# Patient Record
Sex: MALE | Race: BLACK OR AFRICAN AMERICAN | NOT HISPANIC OR LATINO | Employment: UNEMPLOYED | URBAN - METROPOLITAN AREA
[De-identification: names, ages, dates, MRNs, and addresses within clinical notes are randomized per-mention and may not be internally consistent; named-entity substitution may affect disease eponyms.]

---

## 2019-05-05 ENCOUNTER — APPOINTMENT (OUTPATIENT)
Dept: GENERAL RADIOLOGY | Facility: HOSPITAL | Age: 29
End: 2019-05-05

## 2019-05-05 ENCOUNTER — HOSPITAL ENCOUNTER (EMERGENCY)
Facility: HOSPITAL | Age: 29
Discharge: HOME | End: 2019-05-05
Attending: EMERGENCY MEDICINE | Admitting: EMERGENCY MEDICINE

## 2019-05-05 VITALS
TEMPERATURE: 97.9 F | HEIGHT: 71 IN | SYSTOLIC BLOOD PRESSURE: 128 MMHG | DIASTOLIC BLOOD PRESSURE: 50 MMHG | WEIGHT: 202 LBS | OXYGEN SATURATION: 98 % | RESPIRATION RATE: 18 BRPM | BODY MASS INDEX: 28.28 KG/M2 | HEART RATE: 96 BPM

## 2019-05-05 DIAGNOSIS — S30.0XXA LUMBAR CONTUSION, INITIAL ENCOUNTER: Primary | ICD-10-CM

## 2019-05-05 PROCEDURE — 72110 X-RAY EXAM L-2 SPINE 4/>VWS: CPT

## 2019-05-05 PROCEDURE — 99283 EMERGENCY DEPT VISIT LOW MDM: CPT
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
## 2024-07-30 ENCOUNTER — LAB (OUTPATIENT)
Dept: FAMILY MEDICINE CLINIC | Facility: CLINIC | Age: 34
End: 2024-07-30
Payer: COMMERCIAL

## 2024-07-30 ENCOUNTER — OFFICE VISIT (OUTPATIENT)
Dept: FAMILY MEDICINE CLINIC | Facility: CLINIC | Age: 34
End: 2024-07-30
Payer: COMMERCIAL

## 2024-07-30 VITALS
WEIGHT: 280 LBS | RESPIRATION RATE: 18 BRPM | OXYGEN SATURATION: 97 % | BODY MASS INDEX: 39.2 KG/M2 | HEIGHT: 71 IN | HEART RATE: 63 BPM | DIASTOLIC BLOOD PRESSURE: 84 MMHG | SYSTOLIC BLOOD PRESSURE: 134 MMHG

## 2024-07-30 DIAGNOSIS — E66.09 CLASS 2 OBESITY DUE TO EXCESS CALORIES WITHOUT SERIOUS COMORBIDITY IN ADULT, UNSPECIFIED BMI: ICD-10-CM

## 2024-07-30 DIAGNOSIS — Z13.1 DIABETES MELLITUS SCREENING: ICD-10-CM

## 2024-07-30 DIAGNOSIS — R03.0 ELEVATED BLOOD PRESSURE READING: ICD-10-CM

## 2024-07-30 DIAGNOSIS — Z13.220 LIPID SCREENING: ICD-10-CM

## 2024-07-30 DIAGNOSIS — Z00.00 PREVENTATIVE HEALTH CARE: Primary | ICD-10-CM

## 2024-07-30 DIAGNOSIS — R29.818 SUSPECTED SLEEP APNEA: ICD-10-CM

## 2024-07-30 DIAGNOSIS — S86.811A STRAIN OF CALF MUSCLE, RIGHT, INITIAL ENCOUNTER: ICD-10-CM

## 2024-07-30 PROBLEM — E66.812 CLASS 2 OBESITY DUE TO EXCESS CALORIES WITHOUT SERIOUS COMORBIDITY IN ADULT: Status: ACTIVE | Noted: 2024-07-30

## 2024-07-30 LAB — TSH SERPL DL<=0.05 MIU/L-ACNC: 1.21 UIU/ML (ref 0.27–4.2)

## 2024-07-30 PROCEDURE — 83036 HEMOGLOBIN GLYCOSYLATED A1C: CPT | Performed by: INTERNAL MEDICINE

## 2024-07-30 PROCEDURE — 36415 COLL VENOUS BLD VENIPUNCTURE: CPT

## 2024-07-30 PROCEDURE — 84443 ASSAY THYROID STIM HORMONE: CPT | Performed by: INTERNAL MEDICINE

## 2024-07-30 PROCEDURE — 85025 COMPLETE CBC W/AUTO DIFF WBC: CPT | Performed by: INTERNAL MEDICINE

## 2024-07-30 PROCEDURE — 80048 BASIC METABOLIC PNL TOTAL CA: CPT | Performed by: INTERNAL MEDICINE

## 2024-07-30 PROCEDURE — 80061 LIPID PANEL: CPT | Performed by: INTERNAL MEDICINE

## 2024-07-30 NOTE — PROGRESS NOTES
Chief Complaint  [facility]    HPI:    Elieser Vargas presents to [facility]    Patient is a 33-year-old male presenting to establish care.    Patient reports that he has been having right lower leg pain that is predominantly found in the calf. He initial symptoms started about two weeks ago and has been increasing over the past week. Discomfort present all the time but is not always painful. Area, non-radiating, dull ache right lateral calf. Nothing really makes symptoms better or worse other than standing and certain activities.   Patient has been trying OTC medications, activity modifications, ice. Denies massage, stretching, PT. Denies fever, chills, nausea, vomiting. Denies numbness, tingling, weakness, saddle anesthesia, urinary/fecal incontinence, focal sensory/motor deficit. Denies recent trauma, bending/twisting, injury. 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 No prior or recent imaging. Denies prior physical therapy, back corticosteroid injections, surgeries, or procedure.   Denies chest pain, shortness of breath, orthopnea, PND, and lower extremity edema  Denies palpitations, tachycardia, dizziness, lightheadedness, and syncope. Denies personal or family history of DVT or PE or DVT/PE risk factors including recent surgery, periods of immobility     No additional medical conditions. Overall healthy.     Home blood pressures usually about 110's/60-70's. Denies chest pain, shortness of breath, orthopnea, PND, lower extremity edema, palpitations, tachycardia.       Preventative:    Social:     Diet and Exercise: Could be better    Alcohol, Tobacco, and Recreational Drug use: Occasional alcohol and tobacco use    Cancer screenings:  PSA: No known family history of prostate cancer  Colonoscopy: No known family history of colon cancer    Immunizations: Potentially due for tetanus and COVID.    [facility] Directive: Not on  "file      Review of Systems:  ROS negative unless otherwise noted in HPI above.    Past Medical History:   Diagnosis Date    Obesity        No current outpatient medications on file.    Social History     Socioeconomic History    Marital status:    Tobacco Use    Smoking status: Every Day     Current packs/day: 1.00     Average packs/day: 1 pack/day for 15.0 years (15.0 ttl pk-yrs)     Types: Cigars, Cigarettes    Smokeless tobacco: Never   Vaping Use    Vaping status: Never Used   Substance and Sexual Activity    Alcohol use: Yes    Drug use: Never    Sexual activity: Yes     Partners: Female        Objective   Vital Signs:  /84   Pulse 63   Resp 18   Ht 180.3 cm (71\")   Wt 127 kg (280 lb)   SpO2 97%   BMI 39.05 kg/m²   Estimated body mass index is 39.05 kg/m² as calculated from the following:    Height as of this encounter: 180.3 cm (71\").    Weight as of this encounter: 127 kg (280 lb).    Physical Exam:  General: Well-appearing patient, no apparent distress  HEENT: No posterior pharynx erythema, no tonsillar erythema or exudates, normal external auditory canals, TM normal without bulging or erythema  Neck: No cervical lymphadenopathy  Cardiac: Regular rate and rhythm, normal S1/S2, no murmur, rubs or gallops, no lower extremity edema  Lungs: Clear to auscultation bilaterally, no crackles or wheezes  Abdomen: Soft, non-tender, no guarding or rebound tenderness, no hepatosplenomegaly  Skin: No significant rashes or lesions  MSK: Grossly normal tone and strength, point tenderness to deep palpation of the lateral gastrocnemius  Neuro: Alert and oriented x3, CN II-XII grossly intact  Psych: Appropriate mood and affect    Assessment and Plan:    (Z00.00) Preventative health care  Patient is a 33 year old male who is overall doing well. Reviewed social and family history. Encouraged increased healthy diet and exercise and discussed importance to overall health.  Up-to-date with age and gender " appropriate cancer screenings.. Discussed indicated vaccines based on age and comorbidities. No skin, mood concerns.  Plan:  - Encourage healthy diet and exercise  - Up date vaccines, if necessary  - Screening labs as ordered  -Encouraged tobacco cessation    (S86.811A) Strain of calf muscle, right, initial encounter  Assessment: Patient with recent calf strain most likely caused by increased activity at work.  No recent DVT risk factors or increased swelling suggestive of DVT.  Discussed typical clinical course, treatment, as well as signs and symptoms which should prompt reevaluation.  Plan:   - Over-the-counter medications including acetaminophen, ibuprofen as needed  - Activity modification  - Heat/ice as needed  - Voltaren as needed  - Icy hot as needed  - Hold on imaging for now  - Consider referral if not improving    (R03.0) Elevated blood pressure reading -   Assessment: Blood pressure generally well-controlled on home readings.  Blood pressure likely slightly elevated in clinic due to whitecoat effect.  Continuing to monitor home blood pressures before considering adding antihypertensive medication.  Discussed importance of diet and activity.  Plan:  - Basic metabolic panel, CBC & Differential, TSH Rfx On Abnormal To Free T4  - Continue current medications as prescribed  - Intermittently monitor home blood pressures and follow up if elevated  - Encourage healthy diet and exercise    (R29.818) Suspected sleep apnea  Assessment: Patient does have periods of reported snoring and apnea.  Currently scheduled with sleeps medicine tomorrow for consideration of sleep study and sleep apnea.  Plan:  - Follow-up with sleep medicine as scheduled    (E66.09) Class 2 obesity due to excess calories without serious comorbidity in adult, unspecified BMI  Class 2 Severe Obesity (BMI >=35 and <=39.9). Obesity-related health conditions include the following: none. Obesity is newly identified. BMI is is above average; BMI  management plan is completed. We discussed portion control and increasing exercise.    (Z13.220) Lipid screening - Plan: Lipid panel    (Z13.1) Diabetes mellitus screening - Plan: Hemoglobin A1c      Patient was given instructions and counseling regarding his condition or for health maintenance advice. Please see specific information pulled into the AVS if appropriate.       Dr Mario Mcfarlane   Internal Medicine Physician  [facility]--[address], [address]  [place] [phone]

## 2024-07-30 NOTE — PATIENT INSTRUCTIONS
Please stop at lab on second floor to have blood drawn    Due for Tetanus and COVID; can get at any local pharmacy    Calf pain  - Over-the-counter medications including acetaminophen, ibuprofen as needed  - Activity modification  - Heat/ice as needed  - Voltaren as needed  - Icy hot as needed  - Hold on imaging for now  - Consider referral if not improving    Encourage healthy diet and exercise    Monitor and record home blood pressures; follow up if consistently >135/85    Encourage smoking cessation    Follow up annual care or sooner if something arises

## 2024-07-31 ENCOUNTER — PATIENT MESSAGE (OUTPATIENT)
Dept: FAMILY MEDICINE CLINIC | Facility: CLINIC | Age: 34
End: 2024-07-31
Payer: COMMERCIAL

## 2024-07-31 DIAGNOSIS — M79.661 RIGHT CALF PAIN: Primary | ICD-10-CM

## 2024-07-31 LAB
ANION GAP SERPL CALCULATED.3IONS-SCNC: 10.5 MMOL/L (ref 5–15)
BASOPHILS # BLD AUTO: 0.12 10*3/MM3 (ref 0–0.2)
BASOPHILS NFR BLD AUTO: 0.9 % (ref 0–1.5)
BUN SERPL-MCNC: 13 MG/DL (ref 6–20)
BUN/CREAT SERPL: 11.1 (ref 7–25)
CALCIUM SPEC-SCNC: 9.5 MG/DL (ref 8.6–10.5)
CHLORIDE SERPL-SCNC: 106 MMOL/L (ref 98–107)
CHOLEST SERPL-MCNC: 172 MG/DL (ref 0–200)
CO2 SERPL-SCNC: 20.5 MMOL/L (ref 22–29)
CREAT SERPL-MCNC: 1.17 MG/DL (ref 0.76–1.27)
DEPRECATED RDW RBC AUTO: 41.9 FL (ref 37–54)
EGFRCR SERPLBLD CKD-EPI 2021: 83.9 ML/MIN/1.73
EOSINOPHIL # BLD AUTO: 0.69 10*3/MM3 (ref 0–0.4)
EOSINOPHIL NFR BLD AUTO: 5 % (ref 0.3–6.2)
ERYTHROCYTE [DISTWIDTH] IN BLOOD BY AUTOMATED COUNT: 12.8 % (ref 12.3–15.4)
GLUCOSE SERPL-MCNC: 84 MG/DL (ref 65–99)
HBA1C MFR BLD: 5.6 % (ref 4.8–5.6)
HCT VFR BLD AUTO: 46.4 % (ref 37.5–51)
HDLC SERPL-MCNC: 38 MG/DL (ref 40–60)
HGB BLD-MCNC: 15.4 G/DL (ref 13–17.7)
IMM GRANULOCYTES # BLD AUTO: 0.06 10*3/MM3 (ref 0–0.05)
IMM GRANULOCYTES NFR BLD AUTO: 0.4 % (ref 0–0.5)
LDLC SERPL CALC-MCNC: 105 MG/DL (ref 0–100)
LDLC/HDLC SERPL: 2.67 {RATIO}
LYMPHOCYTES # BLD AUTO: 5.96 10*3/MM3 (ref 0.7–3.1)
LYMPHOCYTES NFR BLD AUTO: 43.5 % (ref 19.6–45.3)
MCH RBC QN AUTO: 29.8 PG (ref 26.6–33)
MCHC RBC AUTO-ENTMCNC: 33.2 G/DL (ref 31.5–35.7)
MCV RBC AUTO: 89.9 FL (ref 79–97)
MONOCYTES # BLD AUTO: 0.88 10*3/MM3 (ref 0.1–0.9)
MONOCYTES NFR BLD AUTO: 6.4 % (ref 5–12)
NEUTROPHILS NFR BLD AUTO: 43.8 % (ref 42.7–76)
NEUTROPHILS NFR BLD AUTO: 6 10*3/MM3 (ref 1.7–7)
NRBC BLD AUTO-RTO: 0 /100 WBC (ref 0–0.2)
PLATELET # BLD AUTO: 333 10*3/MM3 (ref 140–450)
PMV BLD AUTO: 9.5 FL (ref 6–12)
POTASSIUM SERPL-SCNC: 4 MMOL/L (ref 3.5–5.2)
RBC # BLD AUTO: 5.16 10*6/MM3 (ref 4.14–5.8)
SODIUM SERPL-SCNC: 137 MMOL/L (ref 136–145)
TRIGL SERPL-MCNC: 162 MG/DL (ref 0–150)
VLDLC SERPL-MCNC: 29 MG/DL (ref 5–40)
WBC NRBC COR # BLD AUTO: 13.71 10*3/MM3 (ref 3.4–10.8)

## 2024-08-05 ENCOUNTER — TELEPHONE (OUTPATIENT)
Dept: FAMILY MEDICINE CLINIC | Facility: CLINIC | Age: 34
End: 2024-08-05
Payer: COMMERCIAL

## 2024-08-05 DIAGNOSIS — M79.661 RIGHT CALF PAIN: Primary | ICD-10-CM

## 2024-08-05 NOTE — TELEPHONE ENCOUNTER
Dr. Mcfarlane, This is what they sent me regarding the order for this patient.   8/2 - please put in new order for [facility].         Thanks, Gianna

## 2024-08-07 ENCOUNTER — TELEPHONE (OUTPATIENT)
Dept: FAMILY MEDICINE CLINIC | Facility: CLINIC | Age: 34
End: 2024-08-07

## 2024-08-07 ENCOUNTER — OFFICE VISIT (OUTPATIENT)
Dept: FAMILY MEDICINE CLINIC | Facility: CLINIC | Age: 34
End: 2024-08-07
Payer: COMMERCIAL

## 2024-08-07 VITALS
HEART RATE: 72 BPM | DIASTOLIC BLOOD PRESSURE: 90 MMHG | HEIGHT: 71 IN | RESPIRATION RATE: 16 BRPM | BODY MASS INDEX: 39.73 KG/M2 | SYSTOLIC BLOOD PRESSURE: 142 MMHG | WEIGHT: 283.8 LBS | OXYGEN SATURATION: 97 %

## 2024-08-07 DIAGNOSIS — R29.818 SUSPECTED SLEEP APNEA: ICD-10-CM

## 2024-08-07 DIAGNOSIS — I10 PRIMARY HYPERTENSION: ICD-10-CM

## 2024-08-07 DIAGNOSIS — M79.661 RIGHT CALF PAIN: Primary | ICD-10-CM

## 2024-08-07 PROCEDURE — 99214 OFFICE O/P EST MOD 30 MIN: CPT | Performed by: INTERNAL MEDICINE

## 2024-08-07 RX ORDER — LOSARTAN POTASSIUM 50 MG/1
50 TABLET ORAL DAILY
Qty: 30 TABLET | Refills: 2 | Status: SHIPPED | OUTPATIENT
Start: 2024-08-07

## 2024-08-07 NOTE — PATIENT INSTRUCTIONS
No labs today    Medications:  Start losartan 50 mg daily    Follow up with imaging as scheduled    Future plans pending results    Monitor and record home blood pressures; let me know in a couple weeks    Follow up with sleep study results    Encourage healthy diet and exercise    Follow up as needed

## 2024-08-07 NOTE — TELEPHONE ENCOUNTER
Caller: [employer]    Relationship: ANA     Best call back number: [phone]     What form or medical record are you requesting: SHORT TERM DISABILITY     Who is requesting this form or medical record from you: MET LAUREANO     How would you like to receive the form or medical records (pick-up, mail, fax): FAX  If fax, what is the fax number: [phone] CLAIM NUMBER:[id number]  Timeframe paperwork needed: AS SOON AS POSSIBLE     Additional notes: ANA IS NEEDING A VERBAL ON DIAGNOSES AND IF PATIENT HAS BEEN TAKEN OUT OF WORK. SHE ALSO NEEDS THE START DATE TO WHEN HE WAS TAKEN OUT OF WORK. ANA NEEDS A CALL BACK WITH THIS INFORMATION, TO START PATIENTS DISABILITY PAYMENTS. [employer] IS SECURE IF NEED TO LEAVE A MESSAGE.

## 2024-08-09 ENCOUNTER — TELEPHONE (OUTPATIENT)
Dept: FAMILY MEDICINE CLINIC | Facility: CLINIC | Age: 34
End: 2024-08-09
Payer: COMMERCIAL

## 2024-08-09 NOTE — TELEPHONE ENCOUNTER
Caller: Sloane Rodrigues    Relationship: Emergency Contact    Best call back number: [phone]     What was the call regarding: PATIENTS WIFE IS FOLLOWING UP ON THE [facility] PAPERWORK. [facility] HAS NOT RECEIVED THE PAPERWORK    PLEASE CALL TO  ADVISE
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
## 2024-08-19 ENCOUNTER — HOSPITAL ENCOUNTER (OUTPATIENT)
Dept: CARDIOLOGY | Facility: HOSPITAL | Age: 34
Discharge: HOME | End: 2024-08-19
Payer: COMMERCIAL

## 2024-08-19 DIAGNOSIS — M79.661 RIGHT CALF PAIN: ICD-10-CM

## 2024-08-19 LAB
BH CV LOWER ARTERIAL LEFT ABI RATIO: 1.17
BH CV LOWER ARTERIAL LEFT DORSALIS PEDIS SYS MAX: 182
BH CV LOWER ARTERIAL LEFT GREAT TOE SYS MAX: 188
BH CV LOWER ARTERIAL LEFT POST TIBIAL SYS MAX: 181
BH CV LOWER ARTERIAL LEFT TBI RATIO: 1.21
BH CV LOWER ARTERIAL RIGHT ABI RATIO: 1.17
BH CV LOWER ARTERIAL RIGHT DORSALIS PEDIS SYS MAX: 181
BH CV LOWER ARTERIAL RIGHT GREAT TOE SYS MAX: 175
BH CV LOWER ARTERIAL RIGHT POST TIBIAL SYS MAX: 180
BH CV LOWER ARTERIAL RIGHT TBI RATIO: 1.13
BH CV LOWER VASCULAR LEFT COMMON FEMORAL AUGMENT: NORMAL
BH CV LOWER VASCULAR LEFT COMMON FEMORAL COMPETENT: NORMAL
BH CV LOWER VASCULAR LEFT COMMON FEMORAL COMPRESS: NORMAL
BH CV LOWER VASCULAR LEFT COMMON FEMORAL PHASIC: NORMAL
BH CV LOWER VASCULAR LEFT COMMON FEMORAL SPONT: NORMAL
BH CV LOWER VASCULAR RIGHT COMMON FEMORAL AUGMENT: NORMAL
BH CV LOWER VASCULAR RIGHT COMMON FEMORAL COMPETENT: NORMAL
BH CV LOWER VASCULAR RIGHT COMMON FEMORAL COMPRESS: NORMAL
BH CV LOWER VASCULAR RIGHT COMMON FEMORAL PHASIC: NORMAL
BH CV LOWER VASCULAR RIGHT COMMON FEMORAL SPONT: NORMAL
BH CV LOWER VASCULAR RIGHT DISTAL FEMORAL COMPRESS: NORMAL
BH CV LOWER VASCULAR RIGHT GASTRONEMIUS COMPRESS: NORMAL
BH CV LOWER VASCULAR RIGHT GREATER SAPH AK COMPRESS: NORMAL
BH CV LOWER VASCULAR RIGHT GREATER SAPH BK COMPRESS: NORMAL
BH CV LOWER VASCULAR RIGHT LESSER SAPH COMPRESS: NORMAL
BH CV LOWER VASCULAR RIGHT MID FEMORAL AUGMENT: NORMAL
BH CV LOWER VASCULAR RIGHT MID FEMORAL COMPETENT: NORMAL
BH CV LOWER VASCULAR RIGHT MID FEMORAL COMPRESS: NORMAL
BH CV LOWER VASCULAR RIGHT MID FEMORAL PHASIC: NORMAL
BH CV LOWER VASCULAR RIGHT MID FEMORAL SPONT: NORMAL
BH CV LOWER VASCULAR RIGHT PERONEAL COMPRESS: NORMAL
BH CV LOWER VASCULAR RIGHT POPLITEAL AUGMENT: NORMAL
BH CV LOWER VASCULAR RIGHT POPLITEAL COMPETENT: NORMAL
BH CV LOWER VASCULAR RIGHT POPLITEAL COMPRESS: NORMAL
BH CV LOWER VASCULAR RIGHT POPLITEAL PHASIC: NORMAL
BH CV LOWER VASCULAR RIGHT POPLITEAL SPONT: NORMAL
BH CV LOWER VASCULAR RIGHT POSTERIOR TIBIAL COMPRESS: NORMAL
BH CV LOWER VASCULAR RIGHT PROXIMAL FEMORAL COMPRESS: NORMAL
BH CV LOWER VASCULAR RIGHT SAPHENOFEMORAL JUNCTION COMPRESS: NORMAL
UPPER ARTERIAL LEFT ARM BRACHIAL SYS MAX: 155
UPPER ARTERIAL RIGHT ARM BRACHIAL SYS MAX: 154

## 2024-08-19 PROCEDURE — 93922 UPR/L XTREMITY ART 2 LEVELS: CPT

## 2024-08-19 PROCEDURE — 93971 EXTREMITY STUDY: CPT

## 2024-08-19 PROCEDURE — 93971 EXTREMITY STUDY: CPT | Performed by: SURGERY

## 2024-08-19 PROCEDURE — 93922 UPR/L XTREMITY ART 2 LEVELS: CPT | Performed by: SURGERY

## 2024-08-27 ENCOUNTER — OFFICE VISIT (OUTPATIENT)
Dept: FAMILY MEDICINE CLINIC | Facility: CLINIC | Age: 34
End: 2024-08-27
Payer: COMMERCIAL

## 2024-08-27 VITALS
HEART RATE: 72 BPM | HEIGHT: 71 IN | SYSTOLIC BLOOD PRESSURE: 136 MMHG | RESPIRATION RATE: 16 BRPM | DIASTOLIC BLOOD PRESSURE: 90 MMHG | OXYGEN SATURATION: 98 % | WEIGHT: 291.2 LBS | BODY MASS INDEX: 40.77 KG/M2

## 2024-08-27 DIAGNOSIS — I10 PRIMARY HYPERTENSION: ICD-10-CM

## 2024-08-27 DIAGNOSIS — M79.661 RIGHT CALF PAIN: ICD-10-CM

## 2024-08-27 DIAGNOSIS — Z72.0 TOBACCO USE: ICD-10-CM

## 2024-08-27 DIAGNOSIS — R06.09 EXERTIONAL DYSPNEA: Primary | ICD-10-CM

## 2024-08-27 PROCEDURE — 93000 ELECTROCARDIOGRAM COMPLETE: CPT | Performed by: INTERNAL MEDICINE

## 2024-08-27 PROCEDURE — 99214 OFFICE O/P EST MOD 30 MIN: CPT | Performed by: INTERNAL MEDICINE

## 2024-08-27 NOTE — PATIENT INSTRUCTIONS
Stay in the room for ECG    Medications:  Continue current medications as prescribed    Continue CPAP    Stress test as scheduled    Encourage healthy diet and exercise    Encourage smoking cessation    Follow up if new, worsening symptoms      
No

## 2024-08-27 NOTE — PROGRESS NOTES
Chief Complaint  No chief complaint on file.    HPI:    Elieser Vargas presents to [facility]    Patient is a 33-year-old male presenting for follow-up.    Patient previously seen for right lower leg/calf pain that started approximately 2 weeks prior to initial establish care visit on 7/30/2024.  Pain described as nonradiating, dull ache of the right lateral calf with nothing making it better or worse.  Symptoms felt secondary to likely calf strain.  Patient reported worsening symptoms and concern for possible blood clot, vascular issue for which further testing with venous ultrasound and ABIs pursued.  Testing without evidence of clot and had normal vascular flow.    Patient overall feeling significantly better with right lower extremity. He felt that symptoms have been slowly improving and has not had any discomfort over the last several days. Denies chest pain, shortness of breath, orthopnea, PND, lower extremity edema, palpitations, tachycardia.     Patient reports that he recently has been increasing short of breath with minimal activity that has been ongoing over the last couple weeks. LUCIANA on CPAP. He reportedly has numerous apneic spells. He has been trying to be compliant nightly. Occasionally reports central chest discomfort that he describes as intermittently, non-radiating, throbbing discomfort. Unsure what causes it or what makes it better or worse, but sometimes feels that heart beats with exertion. Symptoms do not last very long then spontaneously resolves. Denies orthopnea, PND, and lower extremity edema. Denies palpitations, tachycardia, dizziness, lightheadedness, and syncope. Weight stable. Personal or family history of cardiac conditions, diabetes, LUCIANA. Mom with history of HTN. Denies personal or family history of DVT or PE or DVT/PE risk factors including recent surgery, periods of immobility. Smokes about a pack of black and milds daily.  Blood pressures have  "generally been pretty well-controlled on losartan 50 mg daily.  Occasionally will miss dose of medication.      Review of Systems:  ROS negative unless otherwise noted in HPI above.    Past Medical History:   Diagnosis Date    Hyperlipidemia 08/01/2024    Obesity          Current Outpatient Medications:     losartan (Cozaar) 50 MG tablet, Take 1 tablet by mouth Daily., Disp: 30 tablet, Rfl: 2    Social History     Socioeconomic History    Marital status:    Tobacco Use    Smoking status: Every Day     Types: Cigars    Smokeless tobacco: Never   Vaping Use    Vaping status: Never Used   Substance and Sexual Activity    Alcohol use: Yes    Drug use: Never    Sexual activity: Yes     Partners: Female        Objective   Vital Signs:  There were no vitals taken for this visit.  Estimated body mass index is 39.58 kg/m² as calculated from the following:    Height as of 8/7/24: 180.3 cm (71\").    Weight as of 8/7/24: 129 kg (283 lb 12.8 oz).    Physical Exam:  General: Well-appearing patient, no apparent distress  HEENT: No posterior pharynx erythema, no tonsillar erythema or exudates  Cardiac: Regular rate and rhythm, normal S1/S2, no murmur, rubs or gallops, no lower extremity edema  Lungs: Clear to auscultation bilaterally, no crackles or wheezes  Skin: No significant rashes or lesions  MSK: Grossly normal tone and strength, no calf swelling or tenderness to palpation  Neuro: Alert and oriented x3, CN II-XII grossly intact  Psych: Appropriate mood and affect    Assessment and Plan:    (R06.09) Exertional dyspnea -   Assessment: Patient with a few weeks of worsening exertional dyspnea and intermittent chest discomfort that resolves with rest.  Vital stable.  Physical exam unremarkable.  Currently asymptomatic.  Dyspnea could be multifactorial in the setting of known sleep apnea, tobacco use, deconditioning.  After discussion, proceeding with stress test as patient has multiple risk factors including gender, race, " and concern for chest pain/dyspnea.  Patient aware signs symptoms which should prompt reevaluation.  Plan:   - ECG 12 Lead  - Treadmill Stress Test  - Blood pressure control as below  - Encouraged tobacco cessation  - Continue CPAP use  - Follow-up if new or worsening symptoms    (Z72.0) Tobacco use  Assessment: Patient smokes approximately 1 pack of black and milds daily.  Discussed the importance of complete tobacco cessation to overall health  Plan:  - Encourage complete tobacco cessation    (I10) Primary hypertension  Assessment: Blood pressure slightly elevated in clinic.  Discussed the importance of medication compliance as patient occasionally will miss dose of losartan.  Encouraged healthy diet and exercise as well as tobacco cessation.  Plan:  - Creatinine, potassium  - Continue current medications as prescribed  - Intermittently monitor home blood pressures and follow up if elevated  - Encourage healthy diet and exercise  - Encourage complete tobacco cessation    (M79.661) Right calf pain  Assessment: Previous DVT and ABIs negative.  Symptoms most likely secondary to calf strain that has now completely resolved with conservative treatments.  Plan:  - Work note provided for return to work      Patient was given instructions and counseling regarding his condition or for health maintenance advice. Please see specific information pulled into the AVS if appropriate.       Dr Mario Mcfarlane   Internal Medicine Physician  [facility]  [address], [address]  [place] [address]

## 2024-09-06 ENCOUNTER — HOSPITAL ENCOUNTER (OUTPATIENT)
Dept: RESPIRATORY THERAPY | Facility: HOSPITAL | Age: 34
Discharge: HOME | End: 2024-09-06
Admitting: INTERNAL MEDICINE
Payer: COMMERCIAL

## 2024-09-06 DIAGNOSIS — R06.09 EXERTIONAL DYSPNEA: ICD-10-CM

## 2024-09-06 LAB
BH CV STRESS BP STAGE 1: NORMAL
BH CV STRESS BP STAGE 2: NORMAL
BH CV STRESS DURATION MIN STAGE 1: 3
BH CV STRESS DURATION MIN STAGE 2: 3
BH CV STRESS DURATION SEC STAGE 1: 0
BH CV STRESS DURATION SEC STAGE 2: 0
BH CV STRESS GRADE STAGE 1: 10
BH CV STRESS GRADE STAGE 2: 12
BH CV STRESS HR STAGE 1: 128
BH CV STRESS HR STAGE 2: 154
BH CV STRESS METS STAGE 1: 5
BH CV STRESS METS STAGE 2: 7.5
BH CV STRESS PROTOCOL 1: NORMAL
BH CV STRESS RECOVERY BP: NORMAL MMHG
BH CV STRESS RECOVERY HR: 98 BPM
BH CV STRESS SPEED STAGE 1: 1.7
BH CV STRESS SPEED STAGE 2: 2.5
BH CV STRESS STAGE 1: 1
BH CV STRESS STAGE 2: 2
MAXIMAL PREDICTED HEART RATE: 186 BPM
PERCENT MAX PREDICTED HR: 84.95 %
STRESS BASELINE BP: NORMAL MMHG
STRESS BASELINE HR: 84 BPM
STRESS PERCENT HR: 100 %
STRESS POST ESTIMATED WORKLOAD: 7 METS
STRESS POST EXERCISE DUR MIN: 6 MIN
STRESS POST EXERCISE DUR SEC: 21 SEC
STRESS POST PEAK BP: NORMAL MMHG
STRESS POST PEAK HR: 158 BPM
STRESS TARGET HR: 158 BPM

## 2024-09-06 PROCEDURE — 93017 CV STRESS TEST TRACING ONLY: CPT

## 2024-11-12 RX ORDER — LOSARTAN POTASSIUM 50 MG/1
50 TABLET ORAL DAILY
Qty: 30 TABLET | Refills: 2 | Status: SHIPPED | OUTPATIENT
Start: 2024-11-12

## 2025-02-11 RX ORDER — LOSARTAN POTASSIUM 50 MG/1
50 TABLET ORAL DAILY
Qty: 30 TABLET | Refills: 2 | Status: SHIPPED | OUTPATIENT
Start: 2025-02-11

## 2025-05-21 RX ORDER — LOSARTAN POTASSIUM 50 MG/1
50 TABLET ORAL DAILY
Qty: 30 TABLET | Refills: 2 | Status: SHIPPED | OUTPATIENT
Start: 2025-05-21